# Patient Record
Sex: MALE | Race: WHITE | NOT HISPANIC OR LATINO | URBAN - METROPOLITAN AREA
[De-identification: names, ages, dates, MRNs, and addresses within clinical notes are randomized per-mention and may not be internally consistent; named-entity substitution may affect disease eponyms.]

---

## 2018-12-19 ENCOUNTER — OUTPATIENT (OUTPATIENT)
Dept: OUTPATIENT SERVICES | Facility: HOSPITAL | Age: 38
LOS: 1 days | Discharge: HOME | End: 2018-12-19

## 2018-12-27 DIAGNOSIS — F32.9 MAJOR DEPRESSIVE DISORDER, SINGLE EPISODE, UNSPECIFIED: ICD-10-CM

## 2023-04-08 ENCOUNTER — EMERGENCY (EMERGENCY)
Facility: HOSPITAL | Age: 43
LOS: 0 days | Discharge: HOME | End: 2023-04-08
Attending: STUDENT IN AN ORGANIZED HEALTH CARE EDUCATION/TRAINING PROGRAM
Payer: COMMERCIAL

## 2023-04-08 VITALS
RESPIRATION RATE: 16 BRPM | DIASTOLIC BLOOD PRESSURE: 96 MMHG | SYSTOLIC BLOOD PRESSURE: 132 MMHG | HEART RATE: 131 BPM | TEMPERATURE: 100 F | OXYGEN SATURATION: 97 %

## 2023-04-08 VITALS
HEART RATE: 95 BPM | OXYGEN SATURATION: 98 % | RESPIRATION RATE: 18 BRPM | DIASTOLIC BLOOD PRESSURE: 81 MMHG | SYSTOLIC BLOOD PRESSURE: 136 MMHG

## 2023-04-08 DIAGNOSIS — Z20.822 CONTACT WITH AND (SUSPECTED) EXPOSURE TO COVID-19: ICD-10-CM

## 2023-04-08 DIAGNOSIS — R05.9 COUGH, UNSPECIFIED: ICD-10-CM

## 2023-04-08 DIAGNOSIS — R00.0 TACHYCARDIA, UNSPECIFIED: ICD-10-CM

## 2023-04-08 DIAGNOSIS — Z90.49 ACQUIRED ABSENCE OF OTHER SPECIFIED PARTS OF DIGESTIVE TRACT: ICD-10-CM
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
## 2023-04-08 DIAGNOSIS — R07.0 PAIN IN THROAT: ICD-10-CM

## 2023-04-08 DIAGNOSIS — F31.9 BIPOLAR DISORDER, UNSPECIFIED: ICD-10-CM

## 2023-04-08 DIAGNOSIS — Z86.73 PERSONAL HISTORY OF TRANSIENT ISCHEMIC ATTACK (TIA), AND CEREBRAL INFARCTION WITHOUT RESIDUAL DEFICITS: ICD-10-CM

## 2023-04-08 DIAGNOSIS — R50.9 FEVER, UNSPECIFIED: ICD-10-CM

## 2023-04-08 DIAGNOSIS — J02.9 ACUTE PHARYNGITIS, UNSPECIFIED: ICD-10-CM
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
## 2023-04-08 LAB — S PYO AG SPEC QL IA: POSITIVE

## 2023-04-08 PROCEDURE — 87651 STREP A DNA AMP PROBE: CPT

## 2023-04-08 PROCEDURE — 99283 EMERGENCY DEPT VISIT LOW MDM: CPT

## 2023-04-08 PROCEDURE — 99284 EMERGENCY DEPT VISIT MOD MDM: CPT

## 2023-04-08 PROCEDURE — 0225U NFCT DS DNA&RNA 21 SARSCOV2: CPT

## 2023-04-08 PROCEDURE — 87880 STREP A ASSAY W/OPTIC: CPT

## 2023-04-08 PROCEDURE — 87798 DETECT AGENT NOS DNA AMP: CPT

## 2023-04-08 RX ORDER — ACETAMINOPHEN 500 MG
1000 TABLET ORAL ONCE
Refills: 0 | Status: COMPLETED | OUTPATIENT
Start: 2023-04-08 | End: 2023-04-08

## 2023-04-08 RX ADMIN — Medication 1000 MILLIGRAM(S): at 21:34
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
## 2023-04-08 NOTE — ED PROVIDER NOTE - NSICDXPASTSURGICALHX_GEN_ALL_CORE_FT
PAST SURGICAL HISTORY:  Bipolar Disorder     Fracture left thumb- has 2 small pins    S/P Appendectomy
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
## 2023-04-08 NOTE — ED PROVIDER NOTE - PATIENT PORTAL LINK FT
You can access the FollowMyHealth Patient Portal offered by [facility] by registering at the following website: [email or web address]. By joining [employer]’s FollowMyHealth portal, you will also be able to view your health information using other applications (apps) compatible with our system.
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
## 2023-04-09 LAB
RAPID RVP RESULT: SIGNIFICANT CHANGE UP
S PYO DNA THROAT QL NAA+PROBE: ABNORMAL
SARS-COV-2 RNA SPEC QL NAA+PROBE: SIGNIFICANT CHANGE UP

## 2023-08-07 ENCOUNTER — APPOINTMENT (OUTPATIENT)
Dept: UROLOGY | Facility: CLINIC | Age: 43
End: 2023-08-07
Payer: COMMERCIAL

## 2023-08-07 VITALS
OXYGEN SATURATION: 95 % | SYSTOLIC BLOOD PRESSURE: 104 MMHG | RESPIRATION RATE: 14 BRPM | WEIGHT: 200 LBS | TEMPERATURE: 97.5 F | HEART RATE: 79 BPM | HEIGHT: 70 IN | DIASTOLIC BLOOD PRESSURE: 75 MMHG | BODY MASS INDEX: 28.63 KG/M2

## 2023-08-07 DIAGNOSIS — R35.1 NOCTURIA: ICD-10-CM

## 2023-08-07 PROCEDURE — 99204 OFFICE O/P NEW MOD 45 MIN: CPT

## 2023-08-07 RX ORDER — TAMSULOSIN HYDROCHLORIDE 0.4 MG/1
0.4 CAPSULE ORAL
Qty: 30 | Refills: 1 | Status: ACTIVE | COMMUNITY
Start: 2023-08-07 | End: 1900-01-01

## 2023-08-07 NOTE — ASSESSMENT
[FreeTextEntry1] : Labs are ordered. Patient will be placed on Tamsulosin. He will return to the office for a transrectal ultrasound of the prostate in 3 weeks.

## 2023-08-07 NOTE — REVIEW OF SYSTEMS
[Negative] : Heme/Lymph [Wake up at night to urinate  How many times?  ___] : wakes up to urinate [unfilled] times during the night [Strong urge to urinate] : strong urge to urinate

## 2023-08-08 LAB
APPEARANCE: CLEAR
BACTERIA: NEGATIVE /HPF
BILIRUBIN URINE: NEGATIVE
BLOOD URINE: NEGATIVE
CAST: 0 /LPF
COLOR: NORMAL
EPITHELIAL CELLS: 1 /HPF
GLUCOSE QUALITATIVE U: NEGATIVE MG/DL
KETONES URINE: ABNORMAL MG/DL
LEUKOCYTE ESTERASE URINE: NEGATIVE
MICROSCOPIC-UA: NORMAL
NITRITE URINE: NEGATIVE
PH URINE: 6.5
PROTEIN URINE: NEGATIVE MG/DL
PSA SERPL-MCNC: 0.86 NG/ML
RED BLOOD CELLS URINE: 0 /HPF
SPECIFIC GRAVITY URINE: 1.03
UROBILINOGEN URINE: 1 MG/DL
WHITE BLOOD CELLS URINE: 0 /HPF

## 2023-08-10 LAB
BACTERIA UR CULT: NORMAL
URINE CYTOLOGY: NORMAL

## 2023-08-30 ENCOUNTER — APPOINTMENT (OUTPATIENT)
Dept: UROLOGY | Facility: CLINIC | Age: 43
End: 2023-08-30
Payer: COMMERCIAL

## 2023-08-30 VITALS
RESPIRATION RATE: 14 BRPM | DIASTOLIC BLOOD PRESSURE: 81 MMHG | HEART RATE: 73 BPM | OXYGEN SATURATION: 97 % | HEIGHT: 71 IN | WEIGHT: 204 LBS | BODY MASS INDEX: 28.56 KG/M2 | TEMPERATURE: 97.6 F | SYSTOLIC BLOOD PRESSURE: 118 MMHG

## 2023-08-30 DIAGNOSIS — N40.1 BENIGN PROSTATIC HYPERPLASIA WITH LOWER URINARY TRACT SYMPMS: ICD-10-CM

## 2023-08-30 DIAGNOSIS — R35.1 BENIGN PROSTATIC HYPERPLASIA WITH LOWER URINARY TRACT SYMPMS: ICD-10-CM

## 2023-08-30 PROCEDURE — 76872 US TRANSRECTAL: CPT

## 2023-08-30 PROCEDURE — 76857 US EXAM PELVIC LIMITED: CPT
